# Patient Record
(demographics unavailable — no encounter records)

---

## 2025-01-13 NOTE — DISCUSSION/SUMMARY
[de-identified] : We reviewed the imaging together as well as my impression of his symptoms and history.  This may be a prolonged muscular strain spasm given his history of fall or perhaps aggravation of the left-sided SI joint.  Seems less likely to be referred pain from lumbar facet or lumbar radiculopathy.  I recommended a course of physical therapy and we may consider referral for injection for the SI joint should this fail to improve significantly.  He will keep us informed his progress   I have spent greater than 60 minutes preparing to see the patient, collecting relevant history, performing a thorough history and physical examination, counseling the patient regarding my findings ordering the appropriate therapies and tests, communicating with other relevant healthcare professionals, documenting my encounter and coordinating care.

## 2025-01-13 NOTE — PHYSICAL EXAM
[UE/LE] : Sensory: Intact in bilateral upper & lower extremities [Normal DTR Reflexes] : DTR reflexes normal [Normal Proprioception] : sensation intact for proprioception [Normal] : Oriented to person, place, and time, insight and judgement were intact and the affect was normal [de-identified] : AP lateral lumbar spine x-ray PACS 1/13/2025 Loss of disc space height at multiple levels with no significant listhesis noted.  Left-sided degenerative scoliosis apex approximately L2-3 disc.

## 2025-01-13 NOTE — HISTORY OF PRESENT ILLNESS
[de-identified] : 70-year-old male presents as new patient evaluation of left-sided low back pain.  Approximately 1 month ago he sustained a mechanical fall from standing onto his right wrist presented to [facility] emergency department underwent CT scans of the head and neck without acute injury noted.  As any loss of consciousness or denies falling directly onto the low back.  Pain in the right wrist has gradually been improving.  Several days after this injury he describes atraumatic onset of pain in the left side new level of lumbosacral junction that bothers him getting out of bed in the morning typically improves after several hours of moving around.  Denies any radiating pain numbness tingling weakness in extremities.  Does have a history of lumbar radiculopathy in 2016 and 2017 which was treated with physical therapy and epidural steroid junctions

## 2025-03-25 NOTE — HISTORY OF PRESENT ILLNESS
[FreeTextEntry1] : Medical history reviewed; BP increased; Also history of glaucoma  [de-identified] : Retired.  HDL low  Exercise;  2 Children and well  EKG normal

## 2025-03-25 NOTE — ASSESSMENT
[FreeTextEntry1] : Patient seen and examined; Stable exam; All labs; Further plans after review of studies

## 2025-03-25 NOTE — HEALTH RISK ASSESSMENT
[Good] : ~his/her~  mood as  good [No] : No [No falls in past year] : Patient reported no falls in the past year [0] : 2) Feeling down, depressed, or hopeless: Not at all (0) [Never] : Never [Patient reported colonoscopy was normal] : Patient reported colonoscopy was normal [[id number]] : 0 [ColonoscopyDate] : 2024